# Patient Record
Sex: FEMALE
[De-identification: names, ages, dates, MRNs, and addresses within clinical notes are randomized per-mention and may not be internally consistent; named-entity substitution may affect disease eponyms.]

---

## 2020-03-29 NOTE — RAD
EXAM: 

CHEST ONE VIEW



HISTORY:

Shortness of breath and chest pain for 3 days



COMPARISON:

2/19/2013



FINDINGS:

The cardiac silhouette and pulmonary vasculature is within normal limits. The lungs are clear. The os
seous structures are intact. No interval change from prior exam. 



IMPRESSION:

No acute cardiopulmonary process.



Reported By: Joaquin Quijano 

Electronically Signed:  3/29/2020 3:33 PM

## 2020-04-02 NOTE — EKG
Test Reason : 

Blood Pressure : ***/*** mmHG

Vent. Rate : 069 BPM     Atrial Rate : 069 BPM

   P-R Int : 140 ms          QRS Dur : 074 ms

    QT Int : 402 ms       P-R-T Axes : 026 002 034 degrees

   QTc Int : 430 ms

 

Normal sinus rhythm

Minimal voltage criteria for LVH, may be normal variant

Borderline ECG

 

 

Reconfirmed by TOÑITO LEYVA ([id number]),  DAWIT WHITMORE ([id number]) on 4/2/2020 2:36:10 PM

 

Referred By:             Confirmed By:TOÑITO KRAMER

## 2022-07-25 ENCOUNTER — HOSPITAL ENCOUNTER (OUTPATIENT)
Dept: HOSPITAL 92 - ULT | Age: 57
Discharge: HOME | End: 2022-07-25
Attending: STUDENT IN AN ORGANIZED HEALTH CARE EDUCATION/TRAINING PROGRAM
Payer: COMMERCIAL

## 2022-07-25 DIAGNOSIS — R74.8: Primary | ICD-10-CM

## 2022-07-25 DIAGNOSIS — Z90.49: ICD-10-CM

## 2022-07-25 DIAGNOSIS — K76.0: ICD-10-CM

## 2022-07-25 PROCEDURE — 76705 ECHO EXAM OF ABDOMEN: CPT
